# Patient Record
Sex: FEMALE | Race: BLACK OR AFRICAN AMERICAN | Employment: OTHER | URBAN - METROPOLITAN AREA
[De-identification: names, ages, dates, MRNs, and addresses within clinical notes are randomized per-mention and may not be internally consistent; named-entity substitution may affect disease eponyms.]

---

## 2018-07-09 ENCOUNTER — OFFICE VISIT (OUTPATIENT)
Dept: HEMATOLOGY | Age: 68
End: 2018-07-09

## 2018-07-09 ENCOUNTER — HOSPITAL ENCOUNTER (OUTPATIENT)
Dept: LAB | Age: 68
Discharge: HOME | End: 2018-07-09

## 2018-07-09 VITALS
HEART RATE: 76 BPM | SYSTOLIC BLOOD PRESSURE: 144 MMHG | HEIGHT: 63 IN | TEMPERATURE: 97.6 F | DIASTOLIC BLOOD PRESSURE: 70 MMHG | WEIGHT: 133 LBS | BODY MASS INDEX: 23.57 KG/M2 | OXYGEN SATURATION: 99 % | RESPIRATION RATE: 16 BRPM

## 2018-07-09 DIAGNOSIS — C20 CA OF RECTUM (HCC): ICD-10-CM

## 2018-07-09 DIAGNOSIS — R94.5 ABNORMAL RESULTS OF LIVER FUNCTION STUDIES: ICD-10-CM

## 2018-07-09 DIAGNOSIS — K73.0 CHRONIC PERSISTENT HEPATITIS, NOT ELSEWHERE CLASSIFIED (HCC): ICD-10-CM

## 2018-07-09 DIAGNOSIS — R76.8 HCV ANTIBODY POSITIVE: Primary | ICD-10-CM

## 2018-07-09 PROBLEM — M26.609 TMJ (TEMPOROMANDIBULAR JOINT DISORDER): Status: ACTIVE | Noted: 2018-07-09

## 2018-07-09 PROCEDURE — 99001 SPECIMEN HANDLING PT-LAB: CPT | Performed by: INTERNAL MEDICINE

## 2018-07-09 NOTE — PROGRESS NOTES
Wilton Mueller is a 78 y.o. female      1. Have you been to the ER, urgent care clinic or hospitalized since your last visit? NO.     2. Have you seen or consulted any other health care providers outside of the [address] [place] [address] since your last visit (Include any pap smears or colon screening)?  NO          Learning Assessment 7/9/2018   PRIMARY LEARNER Patient   BARRIERS PRIMARY LEARNER NONE   CO-LEARNER CAREGIVER No   PRIMARY LANGUAGE ENGLISH   LEARNER PREFERENCE PRIMARY LISTENING   ANSWERED BY patient   RELATIONSHIP SELF

## 2018-07-09 NOTE — MR AVS SNAPSHOT
[address] [address] 
[phone] Patient: Chuy Brand MRN: [id number] RUT:5/46/[id number] Visit Information Date & Time Provider Department Dept. Phone Encounter #  
 7/9/2018 11:30 AM MD [address] [id number] Your Appointments 7/9/2018 11:30 AM  
New Patient with Dahlia Wei MD  
[address] [facility] ([facility]) Appt Note: NP, diagnosis Hep C- Referred by Dr Lauren Slaughter, scheduled by Laquita Dover fl 04/03/818  
 [facility] [address], [facility] 313 98 [facility] [place] [address], [address] [place] [address] Upcoming Health Maintenance Date Due Hepatitis C Screening 1950 DTaP/Tdap/Td series (1 - Tdap) 9/18/1972 BREAST CANCER SCRN MAMMOGRAM 9/18/2001 FOBT Q 1 YEAR AGE 50-75 9/18/2001 ZOSTER VACCINE AGE 60> 7/18/2011 GLAUCOMA SCREENING Q2Y 9/18/2016 Bone Densitometry (Dexa) Screening 9/18/2016 Pneumococcal 65+ High/Highest Risk (1 of 2 - PCV13) 9/18/2016 MEDICARE YEARLY EXAM 3/14/2018 Influenza Age 5 to Adult 8/1/2018 Allergies as of 7/9/2018  Review Complete On: 7/9/2018 By: Parag Burkett Severity Noted Reaction Type Reactions Sulfa (Sulfonamide Antibiotics)  02/19/2014    Rash Current Immunizations  Never Reviewed No immunizations on file. Not reviewed this visit You Were Diagnosed With   
  
 Codes Comments HCV antibody positive    -  Primary ICD-10-CM: R76.8 ICD-9-CM: 795.79 CA of rectum ([facility] [address] [facility]     ICD-10-CM: C20 
ICD-9-CM: 154.1 Abnormal results of liver function studies     ICD-10-CM: R94.5 ICD-9-CM: 794.8 Chronic persistent hepatitis, not elsewhere classified ([facility] [address] [facility]     ICD-10-CM: K73.0 ICD-9-CM: 571.41 Vitals BP Pulse Temp Resp Height(growth percentile) 144/70 (BP 1 Location: Right arm, BP Patient Position: Sitting) 76 97.6 °F (36.4 °C) (Tympanic) 16 5' 3\" (1.6 m) Weight(growth percentile) SpO2 BMI OB Status Smoking Status 133 lb (60.3 kg) 99% 23.56 kg/m2 Hysterectomy Former Smoker BMI and BSA Data Body Mass Index Body Surface Area  
 23.56 kg/m 2 1.64 m 2 Preferred Pharmacy Pharmacy Name Phone [facility]Y #[id number] - [facility], [place], operated by [facility] [phone] Your Updated Medication List  
  
   
This list is accurate as of 7/9/18 11:18 AM.  Always use your most recent med list.  
  
  
  
  
 mirabegron ER 25 mg ER tablet Commonly known as:  MYRBETRIQ Take 1 Tab by mouth daily. Indications: INCREASED URINARY FREQUENCY  
  
 multivitamin tablet Commonly known as:  ONE A DAY Take 1 Tab by mouth daily. PROBIOTIC 4X 10-15 mg Tbec Generic drug:  B.infantis-B.ani-B.long-B.bifi Take  by mouth. psyllium packet Commonly known as:  METAMUCIL Take 1 Packet by mouth daily. traMADol 50 mg tablet Commonly known as:  ULTRAM  
TAKE 1 TO 2 TABLETS BY MOUTH EVERY 4 TO 6 HOURS AS NEEDED FOR PAIN To-Do List   
 07/09/2018 Lab:  HCV RNA BY GANESH LEE TO QT   
  
 07/09/2018 Lab:  HEP C GENOTYPE Introducing [facility]! Dianna Bernabe introduces Link To Media patient portal. Now you can access parts of your medical record, email your doctor's office, and request medication refills online. 1. In your internet browser, go to [email or web address] 2. Click on the First Time User? Click Here link in the Sign In box. You will see the New Member Sign Up page. 3. Enter your Link To Media Access Code exactly as it appears below. You will not need to use this code after youve completed the sign-up process. If you do not sign up before the expiration date, you must request a new code. · Link To Media Access Code: [email or web address] Expires: 10/7/2018 11:16 AM 
 
 4. Enter the last four digits of your Social Security Number (xxxx) and Date of Birth (mm/dd/yyyy) as indicated and click Submit. You will be taken to the next sign-up page. 5. Create a Operative Mind ID. This will be your Operative Mind login ID and cannot be changed, so think of one that is secure and easy to remember. 6. Create a Operative Mind password. You can change your password at any time. 7. Enter your Password Reset Question and Answer. This can be used at a later time if you forget your password. 8. Enter your e-mail address. You will receive e-mail notification when new information is available in [address] 9. Click Sign Up. You can now view and download portions of your medical record. 10. Click the Download Summary menu link to download a portable copy of your medical information. If you have questions, please visit the Frequently Asked Questions section of the Operative Mind website. Remember, Operative Mind is NOT to be used for urgent needs. For medical emergencies, dial 911. Now available from your iPhone and Android! Please provide this summary of care documentation to your next provider. Your primary care clinician is listed as Phys Other. If you have any questions after today's visit, please call [phone].

## 2018-07-09 NOTE — PROGRESS NOTES
70 Michela Clark MD, [address], [place], [place]       Andra Crowe, MICAELA Fuller, DAJA Dacosta, ACNP-BC   MICAELA Hansen NP [address] [facility] 136    at [facility]    [address] [facility], [address] [place] [address], [place] [address].    [phone]    FAX: [address] [place] [address], [address]    [phone]    FAX: [phone]         Patient Care Team:  Tunde Curry MD as PCP - General Reji Soto. Problem List  Date Reviewed: 7/9/2018          Codes Class Noted    TMJ (temporomandibular joint disorder) ICD-10-CM: M26.609  ICD-9-CM: 524.60  7/9/2018        HCV antibody positive ICD-10-CM: R76.8  ICD-9-CM: 795.79  7/9/2018        CA of rectum ([facility] [address] ICD-10-CM: C20  ICD-9-CM: 154.1  10/31/2016    Overview Signed 7/9/2018 11:07 AM by Gómez Herrera MD     Surgery 2016                       The clinicians listed above have asked me to see Awilda Rey in consultation regarding chronic HCV and its management. All medical records sent by the referring physicians were reviewed     The patient is a 78 y.o. Black female who was screened for anti-HCV and tested positive during birth cohort screening in 4/2018. Risk factors for acquiring HCV are IV drug use in 1970s. There was no history of acute icteric hepatitis at the time of these risk factors. Ultrasound performed in 6/2018. Results suggest that the liver is normal.      An assessment of liver fibrosis with biopsy or elastography has not been performed. The patient has never received treatment for chronic HCV. The patient has no symptoms which can be attributed to the liver disorder.     The patient has not experienced fatigue, pain in the right side over the liver,     The patient completes all daily activities without any functional limitations. All of the issues listed in the Assessment and Plan were discussed with the patient. All questions were answered. The patient expressed a clear understanding of the above. 1901 Located within [facility] 87 in 4 weeks for elastography and to initiate HCV treatment. ASSESSMENT AND PLAN:  Anti-HCV positvie  The most recent laboratory studies indicate that the liver transaminases are normal, alkaline phosphatase is normal, tests of hepatic synthetic and metabolic function are normal, and the platelet count is normal.    Given normal liver enzymes the patient may have had spontaneous resolution of HCV. If HCV RNA is negative the patient will return for a second test to confirm spontaneous resolution and ensure the first HCV RNA test was not a false negative. Once there are 2 negative HCV RNA tests no further testing for HCV is required. If HCV RNA is positive additional evaluation for HCV will be needed prior to treatment. If she is HCV RNA postive we will do the following:  Laboratory testing to monitor liver function and degree of liver injury. This included BMP, hepatic panel, CBC with platelet count,   HCV viral load and HCV genotype to define the specific treatment and duration of treatment that will be required. Serologic and virologic studies to assess for other causes of chronic liver disease. Imaging of the liver with ultrasound   Sheer wave elastography to assess liver fibrosis and determine if a patient has advanced fibrosis or cirrhosis without the need for liver biopsy. Sheer wave elastography is now available at [facility]. The patient has not previously been treated for HCV. Discussed the treatment alternatives.   The SVR/cure rate for HCV now exceeds 90% with just oral anti-viral therapy and no interferon injections or significant side effects for most patients with HCV. The specific treatment is dependent upon genotype, viral load and histology. Discussed enrolling in the Target HCV database which is nationwide program into which the results of clinical treatment of HCV is reported. ALLERGIES  Allergies   Allergen Reactions    Sulfa (Sulfonamide Antibiotics) Rash       MEDICATIONS  Current Outpatient Prescriptions   Medication Sig    traMADol (ULTRAM) 50 mg tablet TAKE 1 TO 2 TABLETS BY MOUTH EVERY 4 TO 6 HOURS AS NEEDED FOR PAIN    mirabegron ER (MYRBETRIQ) 25 mg ER tablet Take 1 Tab by mouth daily. Indications: INCREASED URINARY FREQUENCY    psyllium (METAMUCIL) packet Take 1 Packet by mouth daily.  B.infantis-B.ani-B.long-B.bifi (PROBIOTIC 4X) 10-15 mg TbEC Take  by mouth.  multivitamin (ONE A DAY) tablet Take 1 Tab by mouth daily. No current facility-administered medications for this visit. SYSTEM REVIEW NOT RELATED TO LIVER DISEASE OR REVIEWED ABOVE:  Constitution systems: Negative for fever, chills, weight gain, weight loss. Eyes: Negative for visual changes. ENT: Negative for sore throat, painful swallowing. Respiratory: Negative for cough, hemoptysis, SOB. Cardiology: Negative for chest pain, palpitations. GI:  Negative for constipation or diarrhea. : Negative for urinary frequency, dysuria, hematuria, nocturia. Skin: Negative for rash. Hematology: Negative for easy bruising, blood clots. Musculo-skelatal: Negative for back pain, muscle pain, weakness. Neurologic: Negative for headaches, dizziness, vertigo, memory problems not related to HE. Psychology: Negative for anxiety, depression. FAMILY HISTORY:  The father  of COPD. The mother has the following chronic diseases: heart disease. There is no family history of liver disease. SOCIAL HISTORY:  The patient is . The patient has no children. The patient stopped using tobacco products in .     The patient consumes alcohol on social occasions never in excess. 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 PHYSICAL EXAMINATION:  Visit Vitals    /70 (BP 1 Location: Right arm, BP Patient Position: Sitting)    Pulse 76    Temp 97.6 °F (36.4 °C) (Tympanic)    Resp 16    Ht 5' 3\" (1.6 m)    Wt 133 lb (60.3 kg)    SpO2 99%    BMI 23.56 kg/m2     General: No acute distress. Eyes: Sclera anicteric. ENT: No oral lesions. Thyroid normal.  Nodes: No adenopathy. Skin: No spider angiomata. No jaundice. No palmar erythema. Respiratory: Lungs clear to auscultation. Cardiovascular: Regular heart rate. No murmurs. No JVD. Abdomen: Soft non-tender. Liver size normal to percussion/palpation. Spleen not palpable. No obvious ascites. Extremities: No edema. No muscle wasting. No gross arthritic changes. Neurologic: Alert and oriented. Cranial nerves grossly intact. No asterixis. LABORATORY STUDIES:  From 3/2018  AST/ALT/ALP/T Bili/ALB:  24/19/94/0.7/4.4  WBC/HB/PLT/INR:  5.1/14.3/291  BUN/CREAT:  10/0.66    SEROLOGIES:  4/2018.   HAV total negative, HBsurface antigen negative, anti-HBcore positive, Anti-HCV positive    LIVER HISTOLOGY:  Not available or performed    ENDOSCOPIC PROCEDURES:  Not available or performed    RADIOLOGY:  Not available or performed    OTHER TESTING:  Not available or performed    MD Kaushal Ames 13 [facility] [address], [address]  [phone]

## 2018-07-13 LAB
HCV GENTYP SERPL NAA+PROBE: NORMAL
HCV RNA SERPL NAA+PROBE-ACNC: NORMAL IU/ML
HCV RNA SERPL NAA+PROBE-LOG IU: 6.36 LOG10 IU/ML
HCV RNA SERPL QL NAA+PROBE: POSITIVE
PLEASE NOTE, 550474: NORMAL
TEST INFORMATION: NORMAL

## 2018-07-17 DIAGNOSIS — B18.2 CHRONIC HEPATITIS C WITHOUT HEPATIC COMA (HCC): Primary | ICD-10-CM

## 2018-07-28 ENCOUNTER — HOSPITAL ENCOUNTER (OUTPATIENT)
Dept: ULTRASOUND IMAGING | Age: 68
Discharge: HOME | End: 2018-07-28
Attending: INTERNAL MEDICINE
Payer: MEDICARE

## 2018-07-28 DIAGNOSIS — B18.2 CHRONIC HEPATITIS C WITHOUT HEPATIC COMA (HCC): ICD-10-CM

## 2018-07-28 PROCEDURE — 0346T US ABD LTD W ELASTOGRAPHY: CPT

## 2018-07-31 ENCOUNTER — OFFICE VISIT (OUTPATIENT)
Dept: HEMATOLOGY | Age: 68
End: 2018-07-31

## 2018-07-31 VITALS
OXYGEN SATURATION: 96 % | HEIGHT: 63 IN | SYSTOLIC BLOOD PRESSURE: 116 MMHG | TEMPERATURE: 97.9 F | BODY MASS INDEX: 24.1 KG/M2 | DIASTOLIC BLOOD PRESSURE: 66 MMHG | WEIGHT: 136 LBS | RESPIRATION RATE: 16 BRPM | HEART RATE: 60 BPM

## 2018-07-31 DIAGNOSIS — B18.2 CHRONIC HEPATITIS C WITHOUT HEPATIC COMA (HCC): Primary | ICD-10-CM

## 2018-07-31 NOTE — PROGRESS NOTES
Magalys Nelson MD, [place], [facility] [place]       Maury Sweeney, NP    DAJA Tiwari, [facility]   Marco Ortega, MICAELA [facility]    at [facility]    [facility], [address], [place]  [address].    [phone]    FAX: 126 [facility]    [address], [address], [address]    [phone]    FAX: [phone]       Patient Care Team:  Yassine Agee MD as PCP - General (Internal Medicine) Yassine Agee, [facility], [place]. Problem List  Date Reviewed: 7/9/2018          Codes Class Noted    TMJ (temporomandibular joint disorder) ICD-10-CM: M26.609  ICD-9-CM: 524.60  7/9/2018        Chronic hepatitis C without hepatic coma ([facility] [address] ICD-10-CM: B18.2  ICD-9-CM: 070.54  7/9/2018        CA of rectum ([facility] [address] ICD-10-CM: C20  ICD-9-CM: 154.1  10/31/2016    Overview Signed 7/9/2018 11:07 AM by Roni Kauffman MD     Surgery 2016                     [facility] returns to the [address] for management of chronic HCV. The active problem list, all pertinent past medical history, medications, liver histology, radiologic findings, and laboratory findings related to the liver disorder were reviewed with the patient. The patient is a 78 y.o.  female who was screened for anti-HCV and tested positive during birth cohort screening in 4/2018. Risk factors for acquiring HCV are IV drug use in 1970s. There was no history of acute icteric hepatitis at the time of these risk factors. Ultrasound performed in 7/2018. Results suggest that the liver is normal.      Assessment of liver fibrosis was performed with sheer wave elastography at [facility] in 7/2018.  The result was 4.14 kPa which correlates with normal fibrosis. The patient has never received treatment for chronic HCV. The patient has no symptoms which can be attributed to the liver disorder. The patient has not experienced fatigue, pain in the right side over the liver,     The patient completes all daily activities without any functional limitations. ASSESSMENT AND PLAN:  Chronic HCV   No fibrosis. Liver function is normal.  The platelet count is normal.      Based upon laboratory studies and imaging  the patient does not appear to have advanced liver disease or cirrhosis. Will perform laboratory testing to monitor liver function and degree of liver injury. This will include BMP, hepatic panel, CBC with platelet count. Have performed and/or reviewed results of HCV viral load, HCV genotype to define the specific treatment and duration of treatment that will be required. The need to assess liver fibrosis was discussed. Sheer wave elastography can assess liver fibrosis and determine if a patient has advanced fibrosis or cirrhosis without the need for liver biopsy. Sheer wave elastography is now available at [facility]. Elastography can be repeated annually to demonstrate that the treatment is resolving hepatic fibrosis. The patient has not previously been treated for HCV. The patient has HCV genotype 1A    Discussed the treatment alternatives. The SVR/cure rate for HCV now exceeds 90% with just oral anti-viral therapy and no interferon injections or significant side effects for most patients with HCV. The specific treatment is dependent upon genotype, viral load and histology. The patient should be treated with Harvoni (sofasbuvir and ledipasvir) for 8 weeks. The SVR/cure rate for either of these regimens exceeds 95%. We will request pre-authorization for the HCV medication subject to the approval guidelines of the patient's insurance carrier.   If the patient is denied we may appeal on their behalf. I have explained to her that I will order the medications from the specialty pharmacy and they will be delivered to her home. She may begin taking the treatment medications as soon as they arrive. She is to call and make an appointment for treatment week #4 once she begins therapy. She voiced understanding of this plan. The patient was directed to continue all current medications at the current dosages. There are no contraindications for the patient to take any medications that are necessary for treatment of other medical issues. The patient was counseled regarding alcohol consumption. Vaccination for viral hepatitis A is recommended since the patient has no serologic evidence of previous exposure or vaccination with immunity. Vaccination for viral hepatitis B is not required. The patient has serologic evidence of prior exposure or vaccination with immunity. ALLERGIES  Allergies   Allergen Reactions    Sulfa (Sulfonamide Antibiotics) Rash       MEDICATIONS  Current Outpatient Prescriptions   Medication Sig    psyllium (METAMUCIL) packet Take 1 Packet by mouth daily.  multivitamin (ONE A DAY) tablet Take 1 Tab by mouth daily.  traMADol (ULTRAM) 50 mg tablet TAKE 1 TO 2 TABLETS BY MOUTH EVERY 4 TO 6 HOURS AS NEEDED FOR PAIN    mirabegron ER (MYRBETRIQ) 25 mg ER tablet Take 1 Tab by mouth daily. Indications: INCREASED URINARY FREQUENCY    B.infantis-B.ani-B.long-B.bifi (PROBIOTIC 4X) 10-15 mg TbEC Take  by mouth. No current facility-administered medications for this visit. SYSTEM REVIEW NOT RELATED TO LIVER DISEASE OR REVIEWED ABOVE:  Constitution systems: Negative for fever, chills, weight gain, weight loss. Eyes: Negative for visual changes. ENT: Negative for sore throat, painful swallowing. Respiratory: Negative for cough, hemoptysis, SOB. Cardiology: Negative for chest pain, palpitations.   GI:  Negative for constipation or diarrhea. : Negative for urinary frequency, dysuria, hematuria, nocturia. Skin: Negative for rash. Hematology: Negative for easy bruising, blood clots. Musculo-skelatal: Negative for back pain, muscle pain, weakness. Neurologic: Negative for headaches, dizziness, vertigo, memory problems not related to HE. Psychology: Negative for anxiety, depression. FAMILY HISTORY:  The father  of COPD. The mother has the following chronic diseases: heart disease. There is no family history of liver disease. SOCIAL HISTORY:  The patient is . The patient has no children. The patient stopped using tobacco products in . The patient consumes alcohol on social occasions never in excess. 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 PHYSICAL EXAMINATION:  Visit Vitals    /66 (BP 1 Location: Right arm, BP Patient Position: Sitting)    Pulse 60    Temp 97.9 °F (36.6 °C) (Tympanic)    Resp 16    Ht 5' 3\" (1.6 m)    Wt 136 lb (61.7 kg)    SpO2 96%    BMI 24.09 kg/m2     General: No acute distress. Eyes: Sclera anicteric. ENT: No oral lesions. Thyroid normal.  Nodes: No adenopathy. Skin: No spider angiomata. No jaundice. No palmar erythema. Respiratory: Lungs clear to auscultation. Cardiovascular: Regular heart rate. No murmurs. No JVD. Abdomen: Soft non-tender. Liver size normal to percussion/palpation. Spleen not palpable. No obvious ascites. Extremities: No edema. No muscle wasting. No gross arthritic changes. Neurologic: Alert and oriented. Cranial nerves grossly intact. No asterixis. LABORATORY STUDIES:  From 3/2018  AST/ALT/ALP/T Bili/ALB:  //94/0.7/4.4  WBC/HB/PLT/INR:  5.1/14.3/291  BUN/CREAT:  10/0.66    SEROLOGIES:  2018.   HAV total negative, HBsurface antigen negative, anti-HBcore positive, Anti-HCV positive  Serologies Latest Ref Rng & Units 2018   Hep C Genotype  1a   HCV RT-PCR, Quant IU/mL [id number]     LIVER HISTOLOGY:  7/2018. Sheer wave elastography performed at [facility]. EkPa was E Range: 2.29-5.15 kPa, E Mean: 4.14 kPa, E Median: 4.65 kPa, E Std: 1.02 kPa. The results suggested a fibrosis level of F0. ENDOSCOPIC PROCEDURES:  Not available or performed    RADIOLOGY:  7/2018. Ultrasound of liver. Normal appearing liver. No liver mass lesions. OTHER TESTING:  Not available or performed    FOLLOW-UP:  All of the issues listed in the Assessment and Plan were discussed with the patient. All questions were answered. The patient expressed a clear understanding of the above.     Return to [place] [address] for monitoring of HCV treatment in 4 weeks after starting medication      MICAELA Lawson [place] [address]., [address]  [phone]

## 2018-07-31 NOTE — PROGRESS NOTES
Chris Zendejas is a 78 y.o. female      1. Have you been to the ER, urgent care clinic or hospitalized since your last visit? NO.     2. Have you seen or consulted any other health care providers outside of the [address] [place] [address] since your last visit (Include any pap smears or colon screening)? YES  Patient has been to PCP since last visit.            Learning Assessment 7/9/2018   PRIMARY LEARNER Patient   BARRIERS PRIMARY LEARNER NONE   CO-LEARNER CAREGIVER No   PRIMARY LANGUAGE ENGLISH   LEARNER PREFERENCE PRIMARY LISTENING   ANSWERED BY patient   RELATIONSHIP SELF

## 2018-08-05 PROBLEM — B18.2 CHRONIC HEPATITIS C WITHOUT HEPATIC COMA (HCC): Status: ACTIVE | Noted: 2018-07-09

## 2018-08-05 RX ORDER — LEDIPASVIR AND SOFOSBUVIR 90; 400 MG/1; MG/1
90-400 TABLET, FILM COATED ORAL DAILY
Qty: 28 TAB | Refills: 1 | Status: SHIPPED | OUTPATIENT
Start: 2018-08-05 | End: 2019-01-16 | Stop reason: ALTCHOICE

## 2018-08-07 ENCOUNTER — TELEPHONE (OUTPATIENT)
Dept: HEMATOLOGY | Age: 68
End: 2018-08-07

## 2018-08-20 ENCOUNTER — TELEPHONE (OUTPATIENT)
Dept: HEMATOLOGY | Age: 68
End: 2018-08-20

## 2018-09-18 ENCOUNTER — HOSPITAL ENCOUNTER (OUTPATIENT)
Dept: LAB | Age: 68
Discharge: HOME | End: 2018-09-18

## 2018-09-18 ENCOUNTER — OFFICE VISIT (OUTPATIENT)
Dept: HEMATOLOGY | Age: 68
End: 2018-09-18

## 2018-09-18 VITALS
TEMPERATURE: 97.1 F | RESPIRATION RATE: 16 BRPM | OXYGEN SATURATION: 91 % | DIASTOLIC BLOOD PRESSURE: 66 MMHG | BODY MASS INDEX: 24.1 KG/M2 | WEIGHT: 136 LBS | SYSTOLIC BLOOD PRESSURE: 123 MMHG | HEIGHT: 63 IN | HEART RATE: 92 BPM

## 2018-09-18 DIAGNOSIS — B18.2 CHRONIC HEPATITIS C WITHOUT HEPATIC COMA (HCC): Primary | ICD-10-CM

## 2018-09-18 LAB — XX-LABCORP SPECIMEN COL,LCBCF: NORMAL

## 2018-09-18 PROCEDURE — 99001 SPECIMEN HANDLING PT-LAB: CPT | Performed by: NURSE PRACTITIONER

## 2018-09-18 NOTE — PROGRESS NOTES
1. Have you been to the ER, urgent care clinic since your last visit? Hospitalized since your last visit? No    2. Have you seen or consulted any other health care providers outside of the [address] [place] [address] since your last visit? Include any pap smears or colon screening. Yes When: Pcp visit 9/17/2018.

## 2018-09-18 NOTE — PROGRESS NOTES
[address] Dominick Nelson MD, [place], [place]onel, [place]       Ivana Golden, DAJA Sood ACNP-BC   MICAELA Simons NP [address]    at [facility]    [address] [facility], [facility], [facility] [address]    [phone]    FAX: [address]    at [facility]    [address], [address] [place] [address], [address]    [phone]    FAX: [phone]       Patient Care Team:  Catia Leslie MD as PCP - General (Internal Medicine) Catia Leslie, [place]. Problem List  Date Reviewed: 8/5/2018          Codes Class Noted    TMJ (temporomandibular joint disorder) ICD-10-CM: M26.609  ICD-9-CM: 524.60  7/9/2018        Chronic hepatitis C without hepatic coma ([facility] [address] ICD-10-CM: B18.2  ICD-9-CM: 070.54  7/9/2018        CA of rectum ([facility] [address] ICD-10-CM: C20  ICD-9-CM: 154.1  10/31/2016    Overview Signed 7/9/2018 11:07 AM by Judd Dinh MD     Surgery 2016                     Michelet Jimenez returns to the [address] for management of chronic HCV. The active problem list, all pertinent past medical history, medications, liver histology, radiologic findings, and laboratory findings related to the liver disorder were reviewed with the patient. The patient is a 80 y.o.  female who was screened for anti-HCV and tested positive during birth cohort screening in 4/2018. Ultrasound performed in 7/2018. Results suggest that the liver is normal.      Assessment of liver fibrosis was performed with sheer wave elastography at [facility] in 7/2018. The result was 4.14 kPa which correlates with normal fibrosis. The patient is being treated with Harvoni (sofasbuvir and ledipasvir) for 8 weeks.      The patient has no symptoms which can be attributed to the liver disorder. The patient has not experienced fatigue, pain in the right side over the liver. The patient completes all daily activities without any functional limitations. ASSESSMENT AND PLAN:  Chronic HCV   No fibrosis. Liver function is normal.  The platelet count is normal.      Based upon laboratory studies and imaging  the patient does not appear to have advanced liver disease or cirrhosis. Will perform laboratory testing to monitor liver function and degree of liver injury. This will include BMP, hepatic panel, CBC with platelet count. Have performed and/or reviewed results of HCV viral load, HCV genotype to define the specific treatment and duration of treatment that will be required. The need to assess liver fibrosis was discussed. Sheer wave elastography can assess liver fibrosis and determine if a patient has advanced fibrosis or cirrhosis without the need for liver biopsy. Sheer wave elastography is now available at [facility]. Elastography can be repeated annually to demonstrate that the treatment is resolving hepatic fibrosis. The patient has not previously been treated for HCV. The patient has HCV genotype 1A    The patient is being treated with Harvoni (sofasbuvir and ledipasvir) for 8 weeks. Start date 8/20/18. The patient was directed to continue all current medications at the current dosages. There are no contraindications for the patient to take any medications that are necessary for treatment of other medical issues. The patient was counseled regarding alcohol consumption. Vaccination for viral hepatitis A is recommended since the patient has no serologic evidence of previous exposure or vaccination with immunity. Vaccination for viral hepatitis B is not required. The patient has serologic evidence of prior exposure or vaccination with immunity.       ALLERGIES  Allergies   Allergen Reactions    Sulfa (Sulfonamide Antibiotics) Rash       MEDICATIONS  Current Outpatient Prescriptions   Medication Sig    ledipasvir-sofosbuvir (HARVONI)  mg tab Take  mg by mouth daily.  traMADol (ULTRAM) 50 mg tablet TAKE 1 TO 2 TABLETS BY MOUTH EVERY 4 TO 6 HOURS AS NEEDED FOR PAIN    mirabegron ER (MYRBETRIQ) 25 mg ER tablet Take 1 Tab by mouth daily. Indications: INCREASED URINARY FREQUENCY    psyllium (METAMUCIL) packet Take 1 Packet by mouth daily.  B.infantis-B.ani-B.long-B.bifi (PROBIOTIC 4X) 10-15 mg TbEC Take  by mouth.  multivitamin (ONE A DAY) tablet Take 1 Tab by mouth daily. No current facility-administered medications for this visit. SYSTEM REVIEW NOT RELATED TO LIVER DISEASE OR REVIEWED ABOVE:  Constitution systems: Negative for fever, chills, weight gain, weight loss. Eyes: Negative for visual changes. ENT: Negative for sore throat, painful swallowing. Respiratory: Negative for cough, hemoptysis, SOB. Cardiology: Negative for chest pain, palpitations. GI:  Negative for constipation or diarrhea. : Negative for urinary frequency, dysuria, hematuria, nocturia. Skin: Negative for rash. Hematology: Negative for easy bruising, blood clots. Musculo-skelatal: Negative for back pain, muscle pain, weakness. Neurologic: Negative for headaches, dizziness, vertigo, memory problems not related to HE. Psychology: Negative for anxiety, depression. FAMILY HISTORY:  The father  of COPD. The mother has the following chronic diseases: heart disease. There is no family history of liver disease. SOCIAL HISTORY:  The patient is . The patient has no children. The patient stopped using tobacco products in . The patient consumes alcohol on social occasions never in excess. 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         PHYSICAL EXAMINATION:  Visit Vitals    /66 (BP 1 Location: Left arm, BP Patient Position: Sitting)    Pulse 92    Temp 97.1 °F (36.2 °C) (Tympanic)    Resp 16    Ht 5' 3\" (1.6 m)    Wt 136 lb (61.7 kg)    SpO2 91%    BMI 24.09 kg/m2     General: No acute distress. Eyes: Sclera anicteric. ENT: No oral lesions. Thyroid normal.  Nodes: No adenopathy. Skin: No spider angiomata. No jaundice. No palmar erythema. Respiratory: Lungs clear to auscultation. Cardiovascular: Regular heart rate. No murmurs. No JVD. Abdomen: Soft non-tender. Liver size normal to percussion/palpation. Spleen not palpable. No obvious ascites. Extremities: No edema. No muscle wasting. No gross arthritic changes. Neurologic: Alert and oriented. Cranial nerves grossly intact. No asterixis. LABORATORY STUDIES:  From 3/2018  AST/ALT/ALP/T Bili/ALB:  24/19/94/0.7/4.4  WBC/HB/PLT/INR:  5.1/14.3/291  BUN/CREAT:  10/0.66    SEROLOGIES:  4/2018. HAV total negative, HBsurface antigen negative, anti-HBcore positive, Anti-HCV positive  Serologies Latest Ref Rng & Units 7/9/2018   Hep C Genotype  1a   HCV RT-PCR, Quant IU/mL [id number]     LIVER HISTOLOGY:  7/2018. Sheer wave elastography performed at [facility]. EkPa was E Range: 2.29-5.15 kPa, E Mean: 4.14 kPa, E Median: 4.65 kPa, E Std: 1.02 kPa. The results suggested a fibrosis level of F0. ENDOSCOPIC PROCEDURES:  Not available or performed    RADIOLOGY:  7/2018. Ultrasound of liver. Normal appearing liver. No liver mass lesions. OTHER TESTING:  Not available or performed    FOLLOW-UP:  All of the issues listed in the Assessment and Plan were discussed with the patient. All questions were answered. The patient expressed a clear understanding of the above. Return to [place] [address] for monitoring of HCV treatment in 4 weeks.        MICAELA Zuniga [address] [place] [address] [address]  [address] [place] [address], [address]  [phone]

## 2018-09-18 NOTE — MR AVS SNAPSHOT
[address] [address] [address] [address] 
[phone] Patient: Shane Elizondo MRN: [id number] YUT:[birth year] Visit Information Date & Time Provider Department Dept. Phone Encounter #  
 9/18/2018  9:00 AM MICAELA Santana [address] of  [address] [id number] Follow-up Instructions Return in about 4 weeks (around 10/16/2018) for Marlen Hale . Upcoming Health Maintenance Date Due DTaP/Tdap/Td series (1 - Tdap) 9/18/1972 BREAST CANCER SCRN MAMMOGRAM 9/18/2001 FOBT Q 1 YEAR AGE 50-75 9/18/2001 ZOSTER VACCINE AGE 60> 7/18/2011 GLAUCOMA SCREENING Q2Y 9/18/2016 Bone Densitometry (Dexa) Screening 9/18/2016 Pneumococcal 65+ High/Highest Risk (1 of 2 - PCV13) 9/18/2016 MEDICARE YEARLY EXAM 3/14/2018 Influenza Age 5 to Adult 8/1/2018 Allergies as of 9/18/2018  Review Complete On: 9/18/2018 By: Doug Meredith Severity Noted Reaction Type Reactions Sulfa (Sulfonamide Antibiotics)  02/19/2014    Rash Current Immunizations  Never Reviewed No immunizations on file. Not reviewed this visit You Were Diagnosed With   
  
 Codes Comments Chronic hepatitis C without hepatic coma (HCC)    -  Primary ICD-10-CM: B18.2 ICD-9-CM: 070.54 Vitals BP Pulse Temp Resp Height(growth percentile) 123/66 (BP 1 Location: Left arm, BP Patient Position: Sitting) 92 97.1 °F (36.2 °C) (Tympanic) 16 5' 3\" (1.6 m) Weight(growth percentile) SpO2 BMI OB Status Smoking Status 136 lb (61.7 kg) 91% 24.09 kg/m2 Hysterectomy Former Smoker Vitals History BMI and BSA Data Body Mass Index Body Surface Area 24.09 kg/m 2 1.66 m 2 Preferred Pharmacy Pharmacy Name Phone Lashawn Boyle @ [address], [address] [place] [address] Katharine Potter [phone] Your Updated Medication List  
  
   
 This list is accurate as of 9/18/18  9:35 AM.  Always use your most recent med list.  
  
  
  
  
 ledipasvir-sofosbuvir  mg Tab Commonly known as:  Deneise Lion Take  mg by mouth daily. mirabegron ER 25 mg ER tablet Commonly known as:  MYRBETRIQ Take 1 Tab by mouth daily. Indications: INCREASED URINARY FREQUENCY  
  
 multivitamin tablet Commonly known as:  ONE A DAY Take 1 Tab by mouth daily. PROBIOTIC 4X 10-15 mg Tbec Generic drug:  B.infantis-B.ani-B.long-B.bifi Take  by mouth. psyllium packet Commonly known as:  METAMUCIL Take 1 Packet by mouth daily. traMADol 50 mg tablet Commonly known as:  ULTRAM  
TAKE 1 TO 2 TABLETS BY MOUTH EVERY 4 TO 6 HOURS AS NEEDED FOR PAIN Follow-up Instructions Return in about 4 weeks (around 10/16/2018) for Carlos Enrique . To-Do List   
 09/18/2018 Lab:  CBC WITH AUTOMATED DIFF   
  
 09/18/2018 Lab:  HCV RNA BY NOEL HUNTER,RFLX TO QT   
  
 09/18/2018 Lab:  HEPATIC FUNCTION PANEL   
  
 09/18/2018 Lab:  METABOLIC PANEL, BASIC Introducing [facility]! Gurwinder Tsang introduces [employer] patient portal. Now you can access parts of your medical record, email your doctor's office, and request medication refills online. 1. In your internet browser, go to [email or web address] 2. Click on the First Time User? Click Here link in the Sign In box. You will see the New Member Sign Up page. 3. Enter your [employer] Access Code exactly as it appears below. You will not need to use this code after youve completed the sign-up process. If you do not sign up before the expiration date, you must request a new code. · [employer] Access Code: [email or web address] Expires: 10/7/2018 11:16 AM 
 
4. Enter the last four digits of your Social Security Number (xxxx) and Date of Birth (mm/dd/yyyy) as indicated and click Submit. You will be taken to the next sign-up page. 5. Create a [employer] ID. This will be your [employer] login ID and cannot be changed, so think of one that is secure and easy to remember. 6. Create a [employer] password. You can change your password at any time. 7. Enter your Password Reset Question and Answer. This can be used at a later time if you forget your password. 8. Enter your e-mail address. You will receive e-mail notification when new information is available in [address] 9. Click Sign Up. You can now view and download portions of your medical record. 10. Click the Download Summary menu link to download a portable copy of your medical information. If you have questions, please visit the Frequently Asked Questions section of the [employer] website. Remember, [employer] is NOT to be used for urgent needs. For medical emergencies, dial 911. Now available from your iPhone and Android! Please provide this summary of care documentation to your next provider. Your primary care clinician is listed as Liza Kennedy. If you have any questions after today's visit, please call [phone].

## 2018-09-20 LAB
ALBUMIN SERPL-MCNC: 4.7 G/DL (ref 3.6–4.8)
ALP SERPL-CCNC: 87 IU/L (ref 39–117)
ALT SERPL-CCNC: 11 IU/L (ref 0–32)
AST SERPL-CCNC: 18 IU/L (ref 0–40)
BASOPHILS # BLD AUTO: 0 X10E3/UL (ref 0–0.2)
BASOPHILS NFR BLD AUTO: 1 %
BILIRUB DIRECT SERPL-MCNC: 0.13 MG/DL (ref 0–0.4)
BILIRUB SERPL-MCNC: 0.6 MG/DL (ref 0–1.2)
BUN SERPL-MCNC: 13 MG/DL (ref 8–27)
BUN/CREAT SERPL: 14 (ref 12–28)
CALCIUM SERPL-MCNC: 10.1 MG/DL (ref 8.7–10.3)
CHLORIDE SERPL-SCNC: 102 MMOL/L (ref 96–106)
CO2 SERPL-SCNC: 27 MMOL/L (ref 20–29)
CREAT SERPL-MCNC: 0.91 MG/DL (ref 0.57–1)
EOSINOPHIL # BLD AUTO: 0.2 X10E3/UL (ref 0–0.4)
EOSINOPHIL NFR BLD AUTO: 3 %
ERYTHROCYTE [DISTWIDTH] IN BLOOD BY AUTOMATED COUNT: 13.6 % (ref 12.3–15.4)
GLUCOSE SERPL-MCNC: 88 MG/DL (ref 65–99)
HCT VFR BLD AUTO: 44.8 % (ref 34–46.6)
HCV RNA SERPL QL NAA+PROBE: NEGATIVE
HGB BLD-MCNC: 15.2 G/DL (ref 11.1–15.9)
IMM GRANULOCYTES # BLD: 0 X10E3/UL (ref 0–0.1)
IMM GRANULOCYTES NFR BLD: 0 %
LYMPHOCYTES # BLD AUTO: 2.8 X10E3/UL (ref 0.7–3.1)
LYMPHOCYTES NFR BLD AUTO: 47 %
MCH RBC QN AUTO: 32.3 PG (ref 26.6–33)
MCHC RBC AUTO-ENTMCNC: 33.9 G/DL (ref 31.5–35.7)
MCV RBC AUTO: 95 FL (ref 79–97)
MONOCYTES # BLD AUTO: 0.4 X10E3/UL (ref 0.1–0.9)
MONOCYTES NFR BLD AUTO: 7 %
NEUTROPHILS # BLD AUTO: 2.4 X10E3/UL (ref 1.4–7)
NEUTROPHILS NFR BLD AUTO: 42 %
PLATELET # BLD AUTO: 292 X10E3/UL (ref 150–379)
POTASSIUM SERPL-SCNC: 4.6 MMOL/L (ref 3.5–5.2)
PROT SERPL-MCNC: 7.9 G/DL (ref 6–8.5)
RBC # BLD AUTO: 4.71 X10E6/UL (ref 3.77–5.28)
SODIUM SERPL-SCNC: 144 MMOL/L (ref 134–144)
WBC # BLD AUTO: 5.9 X10E3/UL (ref 3.4–10.8)

## 2018-09-26 ENCOUNTER — TELEPHONE (OUTPATIENT)
Dept: HEMATOLOGY | Age: 68
End: 2018-09-26

## 2018-09-26 NOTE — TELEPHONE ENCOUNTER
Patient has appt coming up with MICAELA Soares on 11/1/18 . Kylah Carver She will not be in the office on that day so patient needs to r/s to her next avaiblale. All phone #s on file states that they are the wrong #.  So I will cancel appt and send her letter thru mail informing of information

## 2018-10-11 ENCOUNTER — TELEPHONE (OUTPATIENT)
Dept: HEMATOLOGY | Age: 68
End: 2018-10-11

## 2018-10-11 NOTE — TELEPHONE ENCOUNTER
Patient states she will finish her last pill of HARVONI on 10/14/18 . Nétsor Mendez  Patient has f/u appt on 12/6/18

## 2019-01-10 ENCOUNTER — OFFICE VISIT (OUTPATIENT)
Dept: HEMATOLOGY | Age: 69
End: 2019-01-10

## 2019-01-10 ENCOUNTER — HOSPITAL ENCOUNTER (OUTPATIENT)
Dept: LAB | Age: 69
Discharge: HOME | End: 2019-01-10

## 2019-01-10 VITALS
WEIGHT: 138.4 LBS | HEART RATE: 67 BPM | SYSTOLIC BLOOD PRESSURE: 124 MMHG | TEMPERATURE: 96.7 F | OXYGEN SATURATION: 98 % | HEIGHT: 63 IN | RESPIRATION RATE: 16 BRPM | BODY MASS INDEX: 24.52 KG/M2 | DIASTOLIC BLOOD PRESSURE: 78 MMHG

## 2019-01-10 DIAGNOSIS — B18.2 CHRONIC HEPATITIS C WITHOUT HEPATIC COMA (HCC): Primary | ICD-10-CM

## 2019-01-10 LAB — XX-LABCORP SPECIMEN COL,LCBCF: NORMAL

## 2019-01-10 PROCEDURE — 99001 SPECIMEN HANDLING PT-LAB: CPT

## 2019-01-10 NOTE — PROGRESS NOTES
[address] [facility], [place], [address], [facility], [facility] Nolberto, NP    Honorio Claudio, DAJA Oneil, [facility]   Trent Hathaway, MICAELA Soni, MICAELA Alanis [facility] [address]    at [address], [address] [facility], [facility].    [phone]    FAX: [address] [place] [address]    at [address], [address], [address]    [phone]    FAX: [phone]     Patient Care Team:  Linda Alfaro MD as PCP - General (Internal Medicine) Nataly Sullivan, [place],  Larissa Fairchild. Problem List  Date Reviewed: 9/18/2018          Codes Class Noted    Hepatitis C virus infection cured after antiviral drug therapy ICD-10-CM: Z86.19  ICD-9-CM: V12.09  1/16/2019        TMJ (temporomandibular joint disorder) ICD-10-CM: M26.609  ICD-9-CM: 524.60  7/9/2018        CA of rectum ([facility]) ICD-10-CM: C20  ICD-9-CM: 154.1  10/31/2016    Overview Signed 7/9/2018 11:07 AM by Hawa Vallejo MD     Surgery 2016                     Esther Mancuso returns to the [address] for management of chronic HCV. The active problem list, all pertinent past medical history, medications, liver histology, radiologic findings, and laboratory findings related to the liver disorder were reviewed with the patient. The patient is a 80 y.o.  female who was screened for anti-HCV and tested positive during birth cohort screening in 4/2018. Ultrasound performed in 7/2018. Results suggest that the liver is normal.      Assessment of liver fibrosis was performed with sheer wave elastography at [facility] in 7/2018. The result was 4.14 kPa which correlates with normal fibrosis. The patient completed treatment with Harvoni (sofasbuvir and ledipasvir) for 8 weeks.  Treatment dates 8/20/2018- 10/15/2018. She is a sustained virologic responder to this treatment, or cured. The patient has no symptoms which can be attributed to the liver disorder. The patient has not experienced fatigue, pain in the right side over the liver. The patient completes all daily activities without any functional limitations. ASSESSMENT AND PLAN:  Chronic HCV with no fibrosis. Liver function is normal.  The platelet count is normal.    Based upon laboratory studies and imaging  the patient does not appear to have advanced liver disease or cirrhosis. Have performed laboratory testing to monitor liver function and degree of liver injury. This included BMP, hepatic panel, CBC with platelet count. The patient has completed treatment with Harvoni (sofasbuvir and ledipasvir) for 8 weeks. Treatment dates 8/20/2018 - 10/15/2018. Have performed laboratory testing to assess for SVR. This included HCV RNA QL REFLX TO QT. She is a sustained virologic responder to this treatment, or cured. She will never be able to donate blood, she can be an organ donor if she chooses, she does not have active immunity and should keep alcohol intake to a minimum. She will no longer need to follow up in our clinic. Encourage regular follow up with her PCP. The patient was directed to continue all current medications at the current dosages. There are no contraindications for the patient to take any medications that are necessary for treatment of other medical issues. The patient was counseled regarding alcohol consumption. Vaccination for viral hepatitis A is recommended since the patient has no serologic evidence of previous exposure or vaccination with immunity. Vaccination for viral hepatitis B is not required. The patient has serologic evidence of prior exposure or vaccination with immunity.       ALLERGIES  Allergies   Allergen Reactions    Sulfa (Sulfonamide Antibiotics) Rash MEDICATIONS  Current Outpatient Medications   Medication Sig    traMADol (ULTRAM) 50 mg tablet TAKE 1 TO 2 TABLETS BY MOUTH EVERY 4 TO 6 HOURS AS NEEDED FOR PAIN    mirabegron ER (MYRBETRIQ) 25 mg ER tablet Take 1 Tab by mouth daily. Indications: INCREASED URINARY FREQUENCY    psyllium (METAMUCIL) packet Take 1 Packet by mouth daily.  B.infantis-B.ani-B.long-B.bifi (PROBIOTIC 4X) 10-15 mg TbEC Take  by mouth.  multivitamin (ONE A DAY) tablet Take 1 Tab by mouth daily. No current facility-administered medications for this visit. SYSTEM REVIEW NOT RELATED TO LIVER DISEASE OR REVIEWED ABOVE:  Constitution systems: Negative for fever, chills, weight gain, weight loss. Eyes: Negative for visual changes. ENT: Negative for sore throat, painful swallowing. Respiratory: Negative for cough, hemoptysis, SOB. Cardiology: Negative for chest pain, palpitations. GI:  Negative for constipation or diarrhea. : Negative for urinary frequency, dysuria, hematuria, nocturia. Skin: Negative for rash. Hematology: Negative for easy bruising, blood clots. Musculo-skelatal: Negative for back pain, muscle pain, weakness. Neurologic: Negative for headaches, dizziness, vertigo, memory problems not related to HE. Psychology: Negative for anxiety, depression. FAMILY HISTORY:  The father  of COPD. The mother has the following chronic diseases: heart disease. There is no family history of liver disease. SOCIAL HISTORY:  The patient is . The patient has no children. The patient stopped using tobacco products in . The patient consumes alcohol on social occasions never in excess. 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         PHYSICAL EXAMINATION:  Visit Vitals  /78 (BP 1 Location: Left arm, BP Patient Position: Sitting)   Pulse 67   Temp 96.7 °F (35.9 °C) (Tympanic)   Resp 16   Ht 5' 3\" (1.6 m)   Wt 138 lb 6.4 oz (62.8 kg)   SpO2 98%   BMI 24.52 kg/m²     General: No acute distress. Eyes: Sclera anicteric. ENT: No oral lesions. Thyroid normal.  Nodes: No adenopathy. Skin: No spider angiomata. No jaundice. No palmar erythema. Respiratory: Lungs clear to auscultation. Cardiovascular: Regular heart rate. No murmurs. No JVD. Abdomen: Soft non-tender. Liver size normal to percussion/palpation. Spleen not palpable. No obvious ascites. Extremities: No edema. No muscle wasting. No gross arthritic changes. Neurologic: Alert and oriented. Cranial nerves grossly intact. No asterixis. LABORATORY STUDIES:  Liver [place] of [address] Units 1/10/2019 9/18/2018   WBC 3.4 - 10.8 x10E3/uL 5.8 5.9   ANC 1.4 - 7.0 x10E3/uL 2.7 2.4   HGB 11.1 - 15.9 g/dL 14.5 15.2    - 379 x10E3/uL 285 292   AST 0 - 40 IU/L 14 18   ALT 0 - 32 IU/L 10 11   Alk Phos 39 - 117 IU/L 82 87   Bili, Total 0.0 - 1.2 mg/dL 1.2 0.6   Bili, Direct 0.00 - 0.40 mg/dL 0.26 0.13   Albumin 3.6 - 4.8 g/dL 4.7 4.7   BUN 8 - 27 mg/dL 9 13   Creat 0.57 - 1.00 mg/dL 0.83 0.91   Na 134 - 144 mmol/L 146 (H) 144   K 3.5 - 5.2 mmol/L 4.1 4.6   Cl 96 - 106 mmol/L 104 102   CO2 20 - 29 mmol/L 30 (H) 27   Glucose 65 - 99 mg/dL 81 88       SEROLOGIES:  4/2018. HAV total negative, HBsurface antigen negative, anti-HBcore positive, Anti-HCV positive    Serologies Latest Ref Rng & Units 7/9/2018   Hep C Genotype  1a   HCV RT-PCR, Quant IU/mL [id number]     9/2018. HCV RNA - Negative. 1/2019. HCV RNA - Negative. LIVER HISTOLOGY:  7/2018. Sheer wave elastography performed at [facility]. EkPa was E Range: 2.29-5.15 kPa, E Mean: 4.14 kPa, E Median: 4.65 kPa, E Std: 1.02 kPa. The results suggested a fibrosis level of F0. ENDOSCOPIC PROCEDURES:  Not available or performed    RADIOLOGY:  7/2018. Ultrasound of liver. Normal appearing liver. No liver mass lesions.     OTHER TESTING:  Not available or performed    FOLLOW-UP:  All of the issues listed in the Assessment and Plan were discussed with the patient. All questions were answered. The patient expressed a clear understanding of the above. FOLLOW-UP:  All of the issues listed above in the Assessment and Plan were discussed with the patient. All questions were answered. The patient expressed a clear understanding of the above. No follow-up at [address] is needed. I would be glad to see the patient back for follow-up at any time in the future if the clinical situation changes.       MARK Lopez-BC  . Radha Foreman [facility] [place] [address]  [address] [place] [address], [address] [place] [address] Street [address]  [phone]

## 2019-01-13 LAB
ALBUMIN SERPL-MCNC: 4.7 G/DL (ref 3.6–4.8)
ALP SERPL-CCNC: 82 IU/L (ref 39–117)
ALT SERPL-CCNC: 10 IU/L (ref 0–32)
AST SERPL-CCNC: 14 IU/L (ref 0–40)
BASOPHILS # BLD AUTO: 0 X10E3/UL (ref 0–0.2)
BASOPHILS NFR BLD AUTO: 0 %
BILIRUB DIRECT SERPL-MCNC: 0.26 MG/DL (ref 0–0.4)
BILIRUB SERPL-MCNC: 1.2 MG/DL (ref 0–1.2)
BUN SERPL-MCNC: 9 MG/DL (ref 8–27)
BUN/CREAT SERPL: 11 (ref 12–28)
CALCIUM SERPL-MCNC: 9.8 MG/DL (ref 8.7–10.3)
CHLORIDE SERPL-SCNC: 104 MMOL/L (ref 96–106)
CO2 SERPL-SCNC: 30 MMOL/L (ref 20–29)
CREAT SERPL-MCNC: 0.83 MG/DL (ref 0.57–1)
EOSINOPHIL # BLD AUTO: 0.1 X10E3/UL (ref 0–0.4)
EOSINOPHIL NFR BLD AUTO: 2 %
ERYTHROCYTE [DISTWIDTH] IN BLOOD BY AUTOMATED COUNT: 13.9 % (ref 12.3–15.4)
GLUCOSE SERPL-MCNC: 81 MG/DL (ref 65–99)
HCT VFR BLD AUTO: 43.6 % (ref 34–46.6)
HCV RNA SERPL QL NAA+PROBE: NEGATIVE
HGB BLD-MCNC: 14.5 G/DL (ref 11.1–15.9)
IMM GRANULOCYTES # BLD AUTO: 0 X10E3/UL (ref 0–0.1)
IMM GRANULOCYTES NFR BLD AUTO: 0 %
LYMPHOCYTES # BLD AUTO: 2.6 X10E3/UL (ref 0.7–3.1)
LYMPHOCYTES NFR BLD AUTO: 45 %
MCH RBC QN AUTO: 31.6 PG (ref 26.6–33)
MCHC RBC AUTO-ENTMCNC: 33.3 G/DL (ref 31.5–35.7)
MCV RBC AUTO: 95 FL (ref 79–97)
MONOCYTES # BLD AUTO: 0.4 X10E3/UL (ref 0.1–0.9)
MONOCYTES NFR BLD AUTO: 7 %
NEUTROPHILS # BLD AUTO: 2.7 X10E3/UL (ref 1.4–7)
NEUTROPHILS NFR BLD AUTO: 46 %
PLATELET # BLD AUTO: 285 X10E3/UL (ref 150–379)
POTASSIUM SERPL-SCNC: 4.1 MMOL/L (ref 3.5–5.2)
PROT SERPL-MCNC: 7.5 G/DL (ref 6–8.5)
RBC # BLD AUTO: 4.59 X10E6/UL (ref 3.77–5.28)
SODIUM SERPL-SCNC: 146 MMOL/L (ref 134–144)
WBC # BLD AUTO: 5.8 X10E3/UL (ref 3.4–10.8)

## 2019-01-14 LAB — HCV RNA SERPL QL NAA+PROBE: NEGATIVE

## 2019-01-16 PROBLEM — Z86.19 HEPATITIS C VIRUS INFECTION CURED AFTER ANTIVIRAL DRUG THERAPY: Status: ACTIVE | Noted: 2019-01-16

## 2019-01-16 PROBLEM — B18.2 CHRONIC HEPATITIS C WITHOUT HEPATIC COMA (HCC): Status: RESOLVED | Noted: 2018-07-09 | Resolved: 2019-01-16

## 2019-01-16 NOTE — PROGRESS NOTES
She is a sustained virologic responder to this treatment, or cured. She will never be able to donate blood, she can be an organ donor if she chooses, she does not have active immunity and should keep alcohol intake to a minimum. She will no longer need to follow up in our clinic. Encourage regular follow up with her PCP.